# Patient Record
Sex: FEMALE
[De-identification: names, ages, dates, MRNs, and addresses within clinical notes are randomized per-mention and may not be internally consistent; named-entity substitution may affect disease eponyms.]

---

## 2019-08-15 ENCOUNTER — HOSPITAL ENCOUNTER (EMERGENCY)
Dept: HOSPITAL 92 - SCSER | Age: 59
Discharge: HOME | End: 2019-08-15
Payer: SELF-PAY

## 2019-08-15 DIAGNOSIS — M79.89: Primary | ICD-10-CM

## 2019-08-15 LAB
ALBUMIN SERPL BCG-MCNC: 3.8 G/DL (ref 3.5–5)
ALP SERPL-CCNC: 123 U/L (ref 40–150)
ALT SERPL W P-5'-P-CCNC: 13 U/L (ref 8–55)
ANION GAP SERPL CALC-SCNC: 11 MMOL/L (ref 10–20)
APTT PPP: 26.4 SEC (ref 22.9–36.1)
AST SERPL-CCNC: 18 U/L (ref 5–34)
BASOPHILS # BLD AUTO: 0.1 THOU/UL (ref 0–0.2)
BASOPHILS NFR BLD AUTO: 1.1 % (ref 0–1)
BILIRUB SERPL-MCNC: 0.4 MG/DL (ref 0.2–1.2)
BUN SERPL-MCNC: 16 MG/DL (ref 9.8–20.1)
CALCIUM SERPL-MCNC: 9 MG/DL (ref 7.8–10.44)
CHLORIDE SERPL-SCNC: 110 MMOL/L (ref 98–107)
CO2 SERPL-SCNC: 28 MMOL/L (ref 22–29)
CREAT CL PREDICTED SERPL C-G-VRATE: 0 ML/MIN (ref 70–130)
EOSINOPHIL # BLD AUTO: 0.3 THOU/UL (ref 0–0.7)
EOSINOPHIL NFR BLD AUTO: 5.3 % (ref 0–10)
GLOBULIN SER CALC-MCNC: 3.1 G/DL (ref 2.4–3.5)
GLUCOSE SERPL-MCNC: 98 MG/DL (ref 70–105)
HGB BLD-MCNC: 12.2 G/DL (ref 12–16)
INR PPP: 0.9
LYMPHOCYTES # BLD: 1.6 THOU/UL (ref 1.2–3.4)
LYMPHOCYTES NFR BLD AUTO: 27.2 % (ref 21–51)
MCH RBC QN AUTO: 32.8 PG (ref 27–31)
MCV RBC AUTO: 100 FL (ref 78–98)
MONOCYTES # BLD AUTO: 0.5 THOU/UL (ref 0.11–0.59)
MONOCYTES NFR BLD AUTO: 8.1 % (ref 0–10)
NEUTROPHILS # BLD AUTO: 3.4 THOU/UL (ref 1.4–6.5)
NEUTROPHILS NFR BLD AUTO: 58.3 % (ref 42–75)
PLATELET # BLD AUTO: 250 THOU/UL (ref 130–400)
POTASSIUM SERPL-SCNC: 4.3 MMOL/L (ref 3.5–5.1)
PROTHROMBIN TIME: 12.5 SEC (ref 12–14.7)
RBC # BLD AUTO: 3.73 MILL/UL (ref 4.2–5.4)
SODIUM SERPL-SCNC: 145 MMOL/L (ref 136–145)
WBC # BLD AUTO: 5.8 THOU/UL (ref 4.8–10.8)

## 2019-08-15 PROCEDURE — 85025 COMPLETE CBC W/AUTO DIFF WBC: CPT

## 2019-08-15 PROCEDURE — 85730 THROMBOPLASTIN TIME PARTIAL: CPT

## 2019-08-15 PROCEDURE — 80053 COMPREHEN METABOLIC PANEL: CPT

## 2019-08-15 PROCEDURE — 85610 PROTHROMBIN TIME: CPT

## 2019-08-15 NOTE — RAD
Exam:Left knee 4 views



HISTORY: Pain.



COMPARISON: None



FINDINGS: No joint effusion. Joint spaces are preserved. No fracture or malalignment



IMPRESSION: Unremarkable 4 views left knee.



Reported By: Ale Mello 

Electronically Signed:  8/15/2019 2:20 PM

## 2019-08-15 NOTE — ULT
LEFT LOWER EXTREMITY VENOUS DOPPLER WITH SPECTRAL ANALYSIS AND COLOR FLOW EVALUATION:

8/15/19

 

HISTORY: 

Left knee pain and swelling. History of prior vein surgery to left lower extremity. Peripheral vascul
ar disease. Patient states she has been wearing a left knee brace for one week. 

 

FINDINGS: 

Gray scale, color flow, Doppler evaluation, with spectral analysis of the left lower extremity venous
 structures is performed with 2D imaging. The left lower extremity common femoral, superficial femora
l, popliteal, posterior tibial and most proximal greater saphenous and profunda femoral veins are jimmy
ged. 

 

There is normal lumen compressibility, flow, and augmentation in the visualized deep venous structure
s of the left lower extremity. 

 

IMPRESSION: 

No evidence of a DVT involving the visualized deep venous structures left lower extremity. 

 

POS: [facility]